# Patient Record
Sex: FEMALE | Race: WHITE | Employment: FULL TIME | URBAN - METROPOLITAN AREA
[De-identification: names, ages, dates, MRNs, and addresses within clinical notes are randomized per-mention and may not be internally consistent; named-entity substitution may affect disease eponyms.]

---

## 2021-07-16 ENCOUNTER — APPOINTMENT (OUTPATIENT)
Dept: CT IMAGING | Age: 36
End: 2021-07-16

## 2021-07-16 ENCOUNTER — HOSPITAL ENCOUNTER (OUTPATIENT)
Age: 36
Setting detail: OBSERVATION
Discharge: HOME | End: 2021-07-17
Attending: EMERGENCY MEDICINE | Admitting: INTERNAL MEDICINE

## 2021-07-16 DIAGNOSIS — R56.9 SEIZURE-LIKE ACTIVITY (HCC): Primary | ICD-10-CM

## 2021-07-16 LAB
-: ABNORMAL
ABSOLUTE EOS #: 0.1 K/UL (ref 0–0.44)
ABSOLUTE IMMATURE GRANULOCYTE: 0.02 K/UL (ref 0–0.3)
ABSOLUTE LYMPH #: 1.66 K/UL (ref 1.1–3.7)
ABSOLUTE MONO #: 0.51 K/UL (ref 0.1–1.2)
ACETAMINOPHEN LEVEL: <10 UG/ML (ref 10–30)
ALBUMIN SERPL-MCNC: 4.3 G/DL (ref 3.5–5.2)
ALBUMIN/GLOBULIN RATIO: ABNORMAL (ref 1–2.5)
ALP BLD-CCNC: 52 U/L (ref 35–104)
ALT SERPL-CCNC: 14 U/L (ref 5–33)
AMORPHOUS: ABNORMAL
AMPHETAMINE SCREEN URINE: NEGATIVE
ANION GAP SERPL CALCULATED.3IONS-SCNC: 14 MMOL/L (ref 9–17)
AST SERPL-CCNC: 16 U/L
BACTERIA: ABNORMAL
BARBITURATE SCREEN URINE: NEGATIVE
BASOPHILS # BLD: 0 % (ref 0–2)
BASOPHILS ABSOLUTE: 0.03 K/UL (ref 0–0.2)
BENZODIAZEPINE SCREEN, URINE: NEGATIVE
BILIRUB SERPL-MCNC: 0.36 MG/DL (ref 0.3–1.2)
BILIRUBIN URINE: NEGATIVE
BUN BLDV-MCNC: 13 MG/DL (ref 6–20)
BUN/CREAT BLD: 16 (ref 9–20)
BUPRENORPHINE URINE: ABNORMAL
CALCIUM SERPL-MCNC: 9.5 MG/DL (ref 8.6–10.4)
CANNABINOID SCREEN URINE: POSITIVE
CASTS UA: ABNORMAL /LPF
CHLORIDE BLD-SCNC: 101 MMOL/L (ref 98–107)
CO2: 19 MMOL/L (ref 20–31)
COCAINE METABOLITE, URINE: NEGATIVE
COLOR: YELLOW
COMMENT UA: ABNORMAL
CREAT SERPL-MCNC: 0.81 MG/DL (ref 0.5–0.9)
CRYSTALS, UA: ABNORMAL /HPF
DIFFERENTIAL TYPE: ABNORMAL
EOSINOPHILS RELATIVE PERCENT: 2 % (ref 1–4)
EPITHELIAL CELLS UA: ABNORMAL /HPF (ref 0–5)
ETHANOL PERCENT: <0.01 %
ETHANOL: <10 MG/DL
GFR AFRICAN AMERICAN: >60 ML/MIN
GFR NON-AFRICAN AMERICAN: >60 ML/MIN
GFR SERPL CREATININE-BSD FRML MDRD: ABNORMAL ML/MIN/{1.73_M2}
GFR SERPL CREATININE-BSD FRML MDRD: ABNORMAL ML/MIN/{1.73_M2}
GLUCOSE BLD-MCNC: 99 MG/DL (ref 70–99)
GLUCOSE URINE: NEGATIVE
HCG QUALITATIVE: NEGATIVE
HCT VFR BLD CALC: 41.1 % (ref 36.3–47.1)
HEMOGLOBIN: 13.5 G/DL (ref 11.9–15.1)
IMMATURE GRANULOCYTES: 0 %
KETONES, URINE: ABNORMAL
LEUKOCYTE ESTERASE, URINE: NEGATIVE
LYMPHOCYTES # BLD: 24 % (ref 24–43)
MAGNESIUM: 2 MG/DL (ref 1.6–2.6)
MCH RBC QN AUTO: 29.7 PG (ref 25.2–33.5)
MCHC RBC AUTO-ENTMCNC: 32.8 G/DL (ref 28.4–34.8)
MCV RBC AUTO: 90.5 FL (ref 82.6–102.9)
MDMA URINE: ABNORMAL
METHADONE SCREEN, URINE: NEGATIVE
METHAMPHETAMINE, URINE: ABNORMAL
MONOCYTES # BLD: 8 % (ref 3–12)
MUCUS: ABNORMAL
NITRITE, URINE: NEGATIVE
NRBC AUTOMATED: 0 PER 100 WBC
OPIATES, URINE: NEGATIVE
OTHER OBSERVATIONS UA: ABNORMAL
OXYCODONE SCREEN URINE: NEGATIVE
PDW BLD-RTO: 12.6 % (ref 11.8–14.4)
PH UA: 8.5 (ref 5–8)
PHENCYCLIDINE, URINE: NEGATIVE
PLATELET # BLD: 288 K/UL (ref 138–453)
PLATELET ESTIMATE: ABNORMAL
PMV BLD AUTO: 9.9 FL (ref 8.1–13.5)
POTASSIUM SERPL-SCNC: 3.5 MMOL/L (ref 3.7–5.3)
PROLACTIN: 91.55 UG/L (ref 4.79–23.3)
PROPOXYPHENE, URINE: ABNORMAL
PROTEIN UA: NEGATIVE
RBC # BLD: 4.54 M/UL (ref 3.95–5.11)
RBC # BLD: ABNORMAL 10*6/UL
RBC UA: ABNORMAL /HPF (ref 0–2)
RENAL EPITHELIAL, UA: ABNORMAL /HPF
SALICYLATE LEVEL: <1 MG/DL (ref 3–10)
SEG NEUTROPHILS: 66 % (ref 36–65)
SEGMENTED NEUTROPHILS ABSOLUTE COUNT: 4.5 K/UL (ref 1.5–8.1)
SODIUM BLD-SCNC: 134 MMOL/L (ref 135–144)
SPECIFIC GRAVITY UA: 1.01 (ref 1–1.03)
TEST INFORMATION: ABNORMAL
TOTAL PROTEIN: 7.2 G/DL (ref 6.4–8.3)
TOXIC TRICYCLIC SC,BLOOD: NEGATIVE
TRICHOMONAS: ABNORMAL
TRICYCLIC ANTIDEPRESSANTS, UR: ABNORMAL
TURBIDITY: CLEAR
URINE HGB: NEGATIVE
UROBILINOGEN, URINE: NORMAL
WBC # BLD: 6.8 K/UL (ref 3.5–11.3)
WBC # BLD: ABNORMAL 10*3/UL
WBC UA: ABNORMAL /HPF (ref 0–5)
YEAST: ABNORMAL

## 2021-07-16 PROCEDURE — 2580000003 HC RX 258: Performed by: EMERGENCY MEDICINE

## 2021-07-16 PROCEDURE — 85025 COMPLETE CBC W/AUTO DIFF WBC: CPT

## 2021-07-16 PROCEDURE — 84703 CHORIONIC GONADOTROPIN ASSAY: CPT

## 2021-07-16 PROCEDURE — 84146 ASSAY OF PROLACTIN: CPT

## 2021-07-16 PROCEDURE — G0378 HOSPITAL OBSERVATION PER HR: HCPCS

## 2021-07-16 PROCEDURE — 93005 ELECTROCARDIOGRAM TRACING: CPT | Performed by: EMERGENCY MEDICINE

## 2021-07-16 PROCEDURE — G0480 DRUG TEST DEF 1-7 CLASSES: HCPCS

## 2021-07-16 PROCEDURE — 80179 DRUG ASSAY SALICYLATE: CPT

## 2021-07-16 PROCEDURE — 80053 COMPREHEN METABOLIC PANEL: CPT

## 2021-07-16 PROCEDURE — 81025 URINE PREGNANCY TEST: CPT

## 2021-07-16 PROCEDURE — 81001 URINALYSIS AUTO W/SCOPE: CPT

## 2021-07-16 PROCEDURE — 99285 EMERGENCY DEPT VISIT HI MDM: CPT

## 2021-07-16 PROCEDURE — 6360000002 HC RX W HCPCS: Performed by: EMERGENCY MEDICINE

## 2021-07-16 PROCEDURE — 80143 DRUG ASSAY ACETAMINOPHEN: CPT

## 2021-07-16 PROCEDURE — 99220 PR INITIAL OBSERVATION CARE/DAY 70 MINUTES: CPT | Performed by: PSYCHIATRY & NEUROLOGY

## 2021-07-16 PROCEDURE — 99219 PR INITIAL OBSERVATION CARE/DAY 50 MINUTES: CPT | Performed by: INTERNAL MEDICINE

## 2021-07-16 PROCEDURE — 2580000003 HC RX 258: Performed by: INTERNAL MEDICINE

## 2021-07-16 PROCEDURE — 96365 THER/PROPH/DIAG IV INF INIT: CPT

## 2021-07-16 PROCEDURE — 96361 HYDRATE IV INFUSION ADD-ON: CPT

## 2021-07-16 PROCEDURE — 83735 ASSAY OF MAGNESIUM: CPT

## 2021-07-16 PROCEDURE — 80307 DRUG TEST PRSMV CHEM ANLYZR: CPT

## 2021-07-16 PROCEDURE — 70450 CT HEAD/BRAIN W/O DYE: CPT

## 2021-07-16 RX ORDER — MAGNESIUM SULFATE 1 G/100ML
1000 INJECTION INTRAVENOUS PRN
Status: DISCONTINUED | OUTPATIENT
Start: 2021-07-16 | End: 2021-07-17 | Stop reason: HOSPADM

## 2021-07-16 RX ORDER — SODIUM CHLORIDE 0.9 % (FLUSH) 0.9 %
5-40 SYRINGE (ML) INJECTION EVERY 12 HOURS SCHEDULED
Status: DISCONTINUED | OUTPATIENT
Start: 2021-07-16 | End: 2021-07-17 | Stop reason: HOSPADM

## 2021-07-16 RX ORDER — POLYETHYLENE GLYCOL 3350 17 G/17G
17 POWDER, FOR SOLUTION ORAL DAILY PRN
Status: DISCONTINUED | OUTPATIENT
Start: 2021-07-16 | End: 2021-07-17 | Stop reason: HOSPADM

## 2021-07-16 RX ORDER — ACETAMINOPHEN 650 MG/1
650 SUPPOSITORY RECTAL EVERY 6 HOURS PRN
Status: DISCONTINUED | OUTPATIENT
Start: 2021-07-16 | End: 2021-07-17 | Stop reason: HOSPADM

## 2021-07-16 RX ORDER — HYDROXYZINE HYDROCHLORIDE 25 MG/1
25 TABLET, FILM COATED ORAL 3 TIMES DAILY PRN
Status: DISCONTINUED | OUTPATIENT
Start: 2021-07-16 | End: 2021-07-17 | Stop reason: HOSPADM

## 2021-07-16 RX ORDER — LORAZEPAM 2 MG/ML
2 INJECTION INTRAMUSCULAR EVERY 4 HOURS PRN
Status: DISCONTINUED | OUTPATIENT
Start: 2021-07-16 | End: 2021-07-17 | Stop reason: HOSPADM

## 2021-07-16 RX ORDER — ACETAMINOPHEN 325 MG/1
650 TABLET ORAL EVERY 6 HOURS PRN
Status: DISCONTINUED | OUTPATIENT
Start: 2021-07-16 | End: 2021-07-17 | Stop reason: HOSPADM

## 2021-07-16 RX ORDER — 0.9 % SODIUM CHLORIDE 0.9 %
1000 INTRAVENOUS SOLUTION INTRAVENOUS ONCE
Status: COMPLETED | OUTPATIENT
Start: 2021-07-16 | End: 2021-07-16

## 2021-07-16 RX ORDER — HYDROXYZINE PAMOATE 25 MG/1
25 CAPSULE ORAL 3 TIMES DAILY PRN
COMMUNITY

## 2021-07-16 RX ORDER — ONDANSETRON 4 MG/1
4 TABLET, ORALLY DISINTEGRATING ORAL EVERY 8 HOURS PRN
Status: DISCONTINUED | OUTPATIENT
Start: 2021-07-16 | End: 2021-07-17 | Stop reason: HOSPADM

## 2021-07-16 RX ORDER — POTASSIUM CHLORIDE 20 MEQ/1
40 TABLET, EXTENDED RELEASE ORAL PRN
Status: DISCONTINUED | OUTPATIENT
Start: 2021-07-16 | End: 2021-07-17 | Stop reason: HOSPADM

## 2021-07-16 RX ORDER — IBUPROFEN 800 MG/1
800 TABLET ORAL EVERY 6 HOURS PRN
Status: DISCONTINUED | OUTPATIENT
Start: 2021-07-16 | End: 2021-07-17 | Stop reason: HOSPADM

## 2021-07-16 RX ORDER — ONDANSETRON 2 MG/ML
4 INJECTION INTRAMUSCULAR; INTRAVENOUS EVERY 6 HOURS PRN
Status: DISCONTINUED | OUTPATIENT
Start: 2021-07-16 | End: 2021-07-17 | Stop reason: HOSPADM

## 2021-07-16 RX ORDER — POTASSIUM CHLORIDE 7.45 MG/ML
10 INJECTION INTRAVENOUS PRN
Status: DISCONTINUED | OUTPATIENT
Start: 2021-07-16 | End: 2021-07-17 | Stop reason: HOSPADM

## 2021-07-16 RX ORDER — SODIUM CHLORIDE 9 MG/ML
25 INJECTION, SOLUTION INTRAVENOUS PRN
Status: DISCONTINUED | OUTPATIENT
Start: 2021-07-16 | End: 2021-07-17 | Stop reason: HOSPADM

## 2021-07-16 RX ORDER — SODIUM CHLORIDE 0.9 % (FLUSH) 0.9 %
10 SYRINGE (ML) INJECTION PRN
Status: DISCONTINUED | OUTPATIENT
Start: 2021-07-16 | End: 2021-07-17 | Stop reason: HOSPADM

## 2021-07-16 RX ORDER — CITALOPRAM 20 MG/1
40 TABLET ORAL DAILY
Status: DISCONTINUED | OUTPATIENT
Start: 2021-07-17 | End: 2021-07-17 | Stop reason: HOSPADM

## 2021-07-16 RX ADMIN — SODIUM CHLORIDE, PRESERVATIVE FREE 10 ML: 5 INJECTION INTRAVENOUS at 22:14

## 2021-07-16 RX ADMIN — SODIUM CHLORIDE 1000 ML: 9 INJECTION, SOLUTION INTRAVENOUS at 16:04

## 2021-07-16 RX ADMIN — LEVETIRACETAM 1000 MG: 100 INJECTION, SOLUTION INTRAVENOUS at 16:55

## 2021-07-16 ASSESSMENT — ENCOUNTER SYMPTOMS
TROUBLE SWALLOWING: 0
NAUSEA: 0
VOMITING: 0
BACK PAIN: 0
VOICE CHANGE: 0
SHORTNESS OF BREATH: 0

## 2021-07-16 ASSESSMENT — PAIN SCALES - GENERAL
PAINLEVEL_OUTOF10: 0
PAINLEVEL_OUTOF10: 0

## 2021-07-16 NOTE — ED NOTES
Bed: 18  Expected date: 7/16/21  Expected time: 3:17 PM  Means of arrival: [facility]  Comments:  [facility]     Jose Beavers RN  07/16/21 1526

## 2021-07-16 NOTE — CONSULTS
[facility] Neurology   IN-PATIENT SERVICE      NEUROLOGY CONSULT  NOTE            Date:   7/16/2021  Patient name:  Bhavya Bojorquez  Date of admission:  7/16/2021  YOB: 1985      Chief Complaint:     Chief Complaint   Patient presents with    Seizures     ater COVID shot       Reason for Consult:      Seizure like activity     History of Present Illness: The patient is a 38 y.o. female who presents with Seizures (ater COVID shot)  . The patient was seen and examined and the chart was reviewed. Received covid vaccine earlier today, then few mins after started to feel anxious, eyes rolled back, started to have convulsions and lost consciousness. No described loss of bowel/bladder control. No tongue biting, or foaming at mouth. Brought to ED for further work up. Prolactin level 91. (+) cannabinoids. CT head negative. Currently at baseline, awake, oriented exam non focal. States similar event in the past when receiving nose piercing in which she does recall feeling anxious prior to. She has hx of anxiety in which she takes Celexa and Vistaril for. She will be admitted for further seizure work up. Past Medical History:     Past Medical History:   Diagnosis Date    Anxiety     Depression     Syncope     has had involuntary movements with it        Past Surgical History:     History reviewed. No pertinent surgical history. Medications Prior to Admission:     Prior to Admission medications    Medication Sig Start Date End Date Taking? Authorizing Provider   hydrOXYzine (VISTARIL) 25 MG capsule Take 25 mg by mouth 3 times daily as needed for Itching   Yes Historical Provider, MD   citalopram (CELEXA) 40 MG tablet Take 40 mg by mouth daily. Yes Historical Provider, MD   ibuprofen (ADVIL;MOTRIN) 800 MG tablet Take 1 tablet by mouth every 6 hours as needed for Pain.  2/16/15   ASHLEY Heath - CNP        Allergies:     Sudafed [pseudoephedrine], Sulfa antibiotics, and Wellbutrin [bupropion]    Social History:     Tobacco:    reports that she has quit smoking. She has never used smokeless tobacco.  Alcohol:      reports current alcohol use. Drug Use:  reports current drug use. Drug: Marijuana. Family History:     Family History   Problem Relation Age of Onset    Hypertension Mother     High Cholesterol Mother        Review of Systems:       Constitutional Negative for fever and chills   HEENT Negative for ear discharge, ear pain, nosebleed. Negative for photophobia, headache. Musculoskeletal Negative for joint pain, negative for myalgia   Respiratory Negative for cough, positive for dyspnea during episode. Negative for hemoptysis and sputum. Cardiovascular Negative for palpitations, chest pain. Negative for orthopnea, claudication. Gastrointestinal Negative for nausea, vomiting. Negative for abdominal pain, diarrhea, blood in stool   Genitourinary  Negative for dysuria, hematuria. Negative for suprapubic pain. Negative for bladder incontinence. Skin Negative for rash or itching   Hematology Negative for ecchymosis, anemia   Psychiatric Positive for anxiety,negative for depression.  Negative for suicidal ideation, hallucinations         Physical Exam:   /73   Pulse 81   Temp 98.1 °F (36.7 °C) (Oral)   Resp 18   Ht 5' 4\" (1.626 m)   Wt 165 lb 5.5 oz (75 kg)   SpO2 99%   BMI 28.38 kg/m²   Temp (24hrs), Av.4 °F (36.9 °C), Min:98.1 °F (36.7 °C), Max:98.6 °F (37 °C)        General examination:      General Appearance:  alert, well appearing, and in no acute distress  HEENT: Normocephalic, atraumatic, moist mucus membranes  Neck: supple, no carotid bruits, (-) nuchal rigidity  Lungs:  Respirations unlabored, chest wall no deformity, BS normal  Cardiovascular: normal rate, regular rhythm  Abdomen: Soft, nontender, nondistended, normal bowel sounds  Skin: No gross lesions, rashes, bruising or bleeding on exposed skin area  Extremities:  peripheral pulses palpable, no cyanosis, clubbing or edema  Psych: normal affect      Neurological examination:    Mental status   Alert and oriented x 3; following all commands; speech is fluent, no dysarthria, aphasia. Cranial nerves   II - visual fields intact to confrontation; pupils reactive  III, IV, VI - extraocular muscles intact; no WALLY; no nystagmus; no ptosis   V - normal facial sensation                                                               VII - normal facial symmetry                                                             VIII - intact hearing                                                                             IX, X - symmetrical palate elevation                                               XI - symmetrical shoulder shrug                                                       XII - midline tongue without atrophy or fasciculation     Motor function  Strength: 5/5 RUE, 5/5 RLE, 5/5 LUE, 5/5  LLE  Normal bulk and tone. No tremors                      Sensory function Intact to touch, pin, vibration, proprioception throughout     Cerebellar Intact finger-nose-finger testing. Intact heel-shin testing. No dysdiadochokinesia present. Reflex function 2/4 symmetric throughout . Downgoing plantar response bilaterally. (-)Ruby's sign bilaterally    Gait                  Normal station and gait             Diagnostics:      Laboratory Testing:  CBC:   Recent Labs     07/16/21  1552   WBC 6.8   HGB 13.5        BMP:    Recent Labs     07/16/21  1552   *   K 3.5*      CO2 19*   BUN 13   CREATININE 0.81   GLUCOSE 99         Lab Results   Component Value Date    ALT 14 07/16/2021    AST 16 07/16/2021       Imaging/Diagnostics:      CT head: No acute process      I personally reviewed all of the above medications, clinical laboratory, imaging and other diagnostic tests. Impression:      1.  Seizure like activity with feeling of anxiety preceding event; suspect convulsive syncope, doubt

## 2021-07-16 NOTE — ED PROVIDER NOTES
[facility]  Emergency Department Encounter     Pt Name: Denise Jones  MRN: [id number]  [place] 1985  Date of evaluation: 7/16/21  PCP:  No primary care provider on file. CHIEF COMPLAINT       Chief Complaint   Patient presents with    Seizures     ater COVID shot       HISTORY OF PRESENT ILLNESS  (Location/Symptom, Timing/Onset, Context/Setting, Quality, Duration, Modifying Factors, Severity.)    Denise Jones is a 38 y.o. female who presents with first-time seizure. Patient states that she was getting her first Covid vaccine at the [address] [place] [address]. She states that she got the vaccine was fine for the first 5 minutes. She states that she was sitting in a chair watching her daughter get ready for her vaccine and this is a lasting that she remembers. Per bystanders she went stiff, eyes rolled back in her head, had generalized shaking and was completely out of it for about 45 minutes afterwards. Blood glucose per EMS was normal.  No history of seizures personally or no family history of seizures. No history of migraines, no history of aneurysms, no recent falls or head trauma. Patient states that she current feels tingly in her lips, hands, feet but no focal weakness. No changes in her vision, no headache, no nausea or vomiting. Did not urinate or defecate herself. PAST MEDICAL / SURGICAL / SOCIAL / FAMILY HISTORY    has a past medical history of Anxiety, Depression, and Syncope.     has no past surgical history on file.     Social History     Socioeconomic History    Marital status: Single     Spouse name: Not on file    Number of children: Not on file    Years of education: Not on file    Highest education level: Not on file   Occupational History    Not on file   Tobacco Use    Smoking status: Former Smoker    Smokeless tobacco: Never Used   Vaping Use    Vaping Use: Every day    Substances: Nicotine    Devices: Pre-filled or refillable cartridge Substance and Sexual Activity    Alcohol use: Yes     Comment: social    Drug use: Yes     Types: Marijuana    Sexual activity: Not on file   Other Topics Concern    Not on file   Social History Narrative    Not on file     Social Determinants of Health     Financial Resource Strain:     Difficulty of Paying Living Expenses:    Food Insecurity:     Worried About Running Out of Food in the Last Year:     [address] in the Last Year:    Transportation Needs:     Lack of Transportation (Medical):  Lack of Transportation (Non-Medical):    Physical Activity:     Days of Exercise per Week:     Minutes of Exercise per Session:    Stress:     Feeling of Stress :    Social Connections:     Frequency of Communication with Friends and Family:     Frequency of Social Gatherings with Friends and Family:     Attends Anabaptism Services:     Active Member of Clubs or Organizations:     Attends Club or Organization Meetings:     Marital Status:    Intimate Partner Violence:     Fear of Current or Ex-Partner:     Emotionally Abused:     Physically Abused:     Sexually Abused:        Family History   Problem Relation Age of Onset    Hypertension Mother     High Cholesterol Mother        Allergies:    Sudafed [pseudoephedrine], Sulfa antibiotics, and Wellbutrin [bupropion]    Home Medications:  Prior to Admission medications    Medication Sig Start Date End Date Taking? Authorizing Provider   hydrOXYzine (VISTARIL) 25 MG capsule Take 25 mg by mouth 3 times daily as needed for Itching   Yes Historical Provider, MD   citalopram (CELEXA) 40 MG tablet Take 40 mg by mouth daily. Yes Historical Provider, MD   ibuprofen (ADVIL;MOTRIN) 800 MG tablet Take 1 tablet by mouth every 6 hours as needed for Pain. 2/16/15   ASHLEY Yun - CNP       REVIEW OF SYSTEMS    (2-9 systems for level 4, 10 or more for level 5)    Review of Systems   Constitutional: Negative for diaphoresis.    HENT: Negative for drooling, tinnitus, trouble swallowing and voice change. Eyes: Negative for visual disturbance. Respiratory: Negative for shortness of breath. Cardiovascular: Negative for chest pain. Gastrointestinal: Negative for nausea and vomiting. Genitourinary: Negative for enuresis. Musculoskeletal: Negative for back pain and neck pain. Skin: Negative for wound. Neurological: Positive for seizures, syncope and numbness. Negative for dizziness, tremors, weakness, light-headedness and headaches. Hematological: Does not bruise/bleed easily. Psychiatric/Behavioral: Positive for confusion. PHYSICAL EXAM   (up to 7 for level 4, 8 or more for level 5)    VITALS:   Vitals:    07/16/21 1532 07/16/21 1600 07/16/21 1615   BP: 118/68 107/76 107/67   Pulse: 73 83 87   Resp: 16 17 18   Temp: 98.6 °F (37 °C)     TempSrc: Oral     SpO2: 100% 100% 100%   Weight: 155 lb (70.3 kg)     Height: 5' 4\" (1.626 m)         Physical Exam  Vitals and nursing note reviewed. Constitutional:       General: She is not in acute distress. Appearance: She is well-developed. She is not diaphoretic. HENT:      Head: Normocephalic and atraumatic. Right Ear: External ear normal.      Left Ear: External ear normal.      Nose: Nose normal.      Mouth/Throat:      Mouth: Mucous membranes are moist.      Pharynx: Oropharynx is clear. Eyes:      Extraocular Movements: Extraocular movements intact. Conjunctiva/sclera: Conjunctivae normal.      Pupils: Pupils are equal, round, and reactive to light. Cardiovascular:      Rate and Rhythm: Normal rate and regular rhythm. Heart sounds: Normal heart sounds. No murmur heard. Pulmonary:      Effort: Pulmonary effort is normal. No respiratory distress. Breath sounds: Normal breath sounds. No wheezing, rhonchi or rales. Abdominal:      General: There is no distension. Palpations: Abdomen is soft. Tenderness: There is no abdominal tenderness.  There is no guarding or rebound. Musculoskeletal:         General: Normal range of motion. Cervical back: Normal range of motion and neck supple. Right lower leg: No edema. Left lower leg: No edema. Skin:     General: Skin is warm and dry. Coloration: Skin is not pale. Neurological:      General: No focal deficit present. Mental Status: She is alert and oriented to person, place, and time. GCS: GCS eye subscore is 4. GCS verbal subscore is 5. GCS motor subscore is 6. Cranial Nerves: Cranial nerves are intact. Sensory: Sensation is intact. Motor: Motor function is intact. Coordination: Coordination is intact. Deep Tendon Reflexes: Babinski sign absent on the right side.    Psychiatric:         Behavior: Behavior normal.         DIFFERENTIAL  DIAGNOSIS   PLAN (LABS / IMAGING / EKG):  Orders Placed This Encounter   Procedures    CT HEAD WO CONTRAST    CBC with DIFF    Comprehensive Metabolic Panel w/ Reflex to MG    PROLACTIN    HCG Qualitative, Serum    TOX SCR, BLD, ED    Urinalysis with Microscopic    Urine Drug Screen    Magnesium    Telemetry monitoring - continuous duration    Inpatient consult to Neurology    Inpatient consult to Hospitalist    EKG 12 Lead    Insert peripheral IV    PATIENT STATUS (FROM ED OR OR/PROCEDURAL) Observation    Seizure precautions       MEDICATIONS ORDERED:  Orders Placed This Encounter   Medications    0.9 % sodium chloride bolus    levETIRAcetam (KEPPRA) 1,000 mg in sodium chloride 0.9 % 100 mL IVPB       DIAGNOSTIC RESULTS / EMERGENCYDEPARTMENT COURSE / MDM   LABS:  Labs Reviewed   CBC WITH AUTO DIFFERENTIAL - Abnormal; Notable for the following components:       Result Value    Seg Neutrophils 66 (*)     All other components within normal limits   COMPREHENSIVE METABOLIC PANEL W/ REFLEX TO MG FOR LOW K - Abnormal; Notable for the following components:    Sodium 134 (*)     Potassium 3.5 (*)     CO2 19 (*)     All other components within normal limits   TOX SCR, BLD, ED - Abnormal; Notable for the following components:    Acetaminophen Level <80 (*)     Salicylate Lvl <1 (*)     All other components within normal limits   URINALYSIS WITH MICROSCOPIC - Abnormal; Notable for the following components:    Ketones, Urine TRACE (*)     pH, UA 8.5 (*)     All other components within normal limits   URINE DRUG SCREEN - Abnormal; Notable for the following components:    Cannabinoid Scrn, Ur POSITIVE (*)     All other components within normal limits   HCG, SERUM, QUALITATIVE   MAGNESIUM   PROLACTIN       RADIOLOGY:  CT HEAD WO CONTRAST    Result Date: 7/16/2021  EXAMINATION: CT OF THE HEAD WITHOUT CONTRAST  7/16/2021 1:57 pm TECHNIQUE: CT of the head was performed without the administration of intravenous contrast. Dose modulation, iterative reconstruction, and/or weight based adjustment of the mA/kV was utilized to reduce the radiation dose to as low as reasonably achievable. COMPARISON: None. HISTORY: ORDERING SYSTEM PROVIDED HISTORY: first time seizure TECHNOLOGIST PROVIDED HISTORY: first time seizure Decision Support Exception - unselect if not a suspected or confirmed emergency medical condition->Emergency Medical Condition (MA) Is the patient pregnant?->No Reason for Exam: Pt states she had her first dose of the covid shot and had a seizure 4 minutes afterwards. Pt says she doesn't have any history of seizures ever before. Acuity: Acute Type of Exam: Initial Mechanism of Injury: seizure FINDINGS: BRAIN/VENTRICLES: No acute loss of the gray-white matter differentiation is identified to suggest acute or subacute infarct. No masses or hemorrhages within the brain parenchyma are found. No evidence of midline shift. The intracranial vasculature, including the dural venous sinuses, is within normal limits. ORBITS: No acute orbital abnormalities are identified. SINUSES: The visualized paranasal sinuses and mastoid air cells are clear. SOFT TISSUES/SKULL: The calvarium is intact. Extracranial soft tissues are unremarkable. No acute intracranial abnormality.        EKG    EKG Interpretation    Interpreted by emergency department physician    Rhythm: normal sinus   Rate: normal  Axis: normal  Ectopy: none  Conduction: normal  ST Segments: no acute change  T Waves: no acute change  Q Waves: III    Clinical Impression: non-specific EKG    All EKG's are interpreted by the Emergency Department Physician whoeither signs or Co-signs this chart in the absence of a cardiologist.    EMERGENCY DEPARTMENT COURSE:  ED Course as of Jul 16 1916 Fri Jul 16, 2021   1549 Per nursing patient informed her that she \"had a feeling that she was going to have a seizure after her vaccine\" earlier today    [AO]   1602 CBC with DIFF(!):    WBC 6.8   RBC 4.54   Hemoglobin Quant 13.5   Hematocrit 41.1   MCV 90.5   MCH 29.7   MCHC 32.8   RDW 12.6   Platelet Count 223   MPV 9.9   NRBC Automated 0.0   Differential Type NOT REPORTED   Seg Neutrophils 66(!)   Lymphocytes 24   Monocytes 8   Eosinophils % 2   Basophils 0   Immature Granulocytes 0   Segs Absolute 4.50   Absolute Lymph # 1.66   Absolute Mono # 0.51   Absolute Eos # 0.10   Basophils Absolute 0.03   Absolute Immature Granulocyte 0.02   WBC Morphology NOT REPORTED   R... [AO]   1617 hCG Qual: NEGATIVE [AO]   1631 TOX SCR, BLD, ED(!):    Acetaminophen Level <10(!)   Ethanol <10   Ethanol percent <3.622   Salicylate Lvl <1(!)   Toxic Tricyclic Sc,Blood PENDING [AO]   1631 Comprehensive Metabolic Panel w/ Reflex to MG(!):    Glucose 99   BUN 13   Creatinine 0.81   Bun/Cre Ratio 16   Calcium 9.5   Sodium 134(!)   Potassium PENDING   Chloride 101   CO2 19(!)   Anion Gap 14   Alk Phos 52   ALT 14   AST 16   Bilirubin 0.36   Total Protein 7.2   Albumin 4.3   Albumin/Globulin Ratio NOT REPORTED   GFR Non- >60   GFR  >60   GFR Comment        GFR Staging NOT REPORTED [AO]   1707 Magnesium: 2.0 [AO]   1735 CT HEAD WO CONTRAST [AO]   0306 Spoke to Dr. Alessia Singleton, will follow along as consult, intermed to admit primary    [AO]   [id number] Accepted for admission by interm    [AO]      ED Course User Index  [AO] Nini Taylor 1721, DO       MDM  Number of Diagnoses or Management Options  Seizure-like activity [facility])  Diagnosis management comments: 47-year-old female with no neurological history presents emergency department after witnessed seizure-like activity after getting her Covid vaccine. On my evaluation she is in no acute distress normal vital signs. Nonfocal neurological exam.  However as this is possible first-time seizure she warrants work-up. Labs unremarkable. Urine drug screen positive for cannabis, however no UTI or other illicit drugs in her system. CT head negative. EKG unremarkable. Discussed admission with patient for first-time work-up, she was concerned about insurance. I did speak to neurology who evaluated her at bedside. I also spoke to internal medicine who is agreeable to admit her. Patient several 10 on admission requesting outpatient follow-up, however we discussed the limitations on driving she was more agreeable to inpatient work-up. Patient hemodynamically stable with no seizure-like episodes in the emergency department. Admitted to hospitalist as primary with neurology on his consult. Patient was loaded with 1 g of Keppra while in the emergency department. Amount and/or Complexity of Data Reviewed  Clinical lab tests: ordered and reviewed  Tests in the radiology section of CPT®: ordered and reviewed  Review and summarize past medical records: yes  Independent visualization of images, tracings, or specimens: yes    Patient Progress  Patient progress: stable      PROCEDURES:  Procedures     CONSULTS:  IP CONSULT TO NEUROLOGY  IP CONSULT TO HOSPITALIST  IP CONSULT TO NEUROLOGY    CRITICAL CARE:  NONE    FINAL IMPRESSION     1.  Seizure-like activity ([address]        DISPOSITION / Michelle Moore.  Kody Kats,   Emergency Medicine Physician    (Please note that portions of this note were completed with a voice recognition program.  Efforts were made to edit the dictations but occasionally words are mis-transcribed.)        Nini Taylor 1721,   Resident  07/16/21 1916

## 2021-07-16 NOTE — H&P
[facility]  Office: [address], DO, Verónicaeric Neri, DO, Corikeely Rdz DO, Ericka Clay, DO, Chani Panchal MD, Gerda Mcarthur MD, Katherine Granados MD, Johnny Craven MD, Anish Hope MD, Olinda Llanes MD, Sasha Chang MD, Shelley Marquis, DO, Artem Keith MD, Juan Luis Johnson DO, Katelynn Szymanski MD,  Carey Jean DO, Deandre Tate MD, Cynthia Gardiner MD, Jake Fox MD, Rony Van MD, Bonnie Suarez MD, Mahogany Tao MD, Arslan Wilson, [facility], [address], [facility], Chava Escamilla, CNP, Tara Malik, CNS, Eladio Andrew, CNP, Jerri Edwards, CNP, Narciso Harris, CNP, Simona Carpenter, [facility], Bora Deleon, CNP, Wendy Miles PA-C, Yue Chirinos, [facility], Ines Garzon, CNP, Savannah Martinez, CNP, Edgar Shankar, CNP, Corky Zheng, CNP, Jenny Hanson, CNP, Jericho Davepnort, CNP, Jean-Paul Quevedo CNP, Holly Winters, [facility]    HISTORY AND PHYSICAL EXAMINATION            Date:   7/16/2021  Patient name:  Lilian Luz  Date of admission:  7/16/2021  3:28 PM  MRN:   [id number]  Account:  [de-identified]  YOB: 1985  PCP:    No primary care provider on file. Room:   [place] [address]  Code Status:    No Order    Chief Complaint:     Chief Complaint   Patient presents with    Seizures     ater COVID shot       History Obtained From:     patient, electronic medical record    History of Present Illness:     Lilian Luz is a 38 y.o. Non-/non  female who presents with Seizures (ater COVID shot)   and is admitted to the hospital for the management of Seizure-like activity ([facility] [address] This 38 yof just had her 1st covid vaccine, watched her niece get it, then her daughter was about to get hers when she passed out and had tonic clonic activity, then was unarousable for 5-10 min per report. This occurred about 5 minutes after injection.   She has had several episodes of syncope in past, including getting swelling/edema   ALLERGIC/IMMUNOLOGIC:  negative for urticaria , itching  ENDOCRINE:  negative increase in drinking, increase in urination, hot or cold intolerance  MUSCULOSKELETAL:  negative joint pains, muscle aches, swelling of joints  NEUROLOGICAL:  negative for headaches, dizziness, lightheadedness, numbness, pain, tingling extremities  BEHAVIOR/PSYCH:  positive for depression, anxiety    Physical Exam:   /67   Pulse 87   Temp 98.6 °F (37 °C) (Oral)   Resp 18   Ht 5' 4\" (1.626 m)   Wt 155 lb (70.3 kg)   SpO2 100%   BMI 26.61 kg/m²   Temp (24hrs), Av.6 °F (37 °C), Min:98.6 °F (37 °C), Max:98.6 °F (37 °C)    No results for input(s): POCGLU in the last 72 hours. No intake or output data in the 24 hours ending 21 1823    General Appearance:  alert, well appearing, and in no acute distress  Mental status: oriented to person, place, and time  Head:  normocephalic, atraumatic  Eye: no icterus, redness, pupils equal and reactive, extraocular eye movements intact, conjunctiva clear  Ear: normal external ear, no discharge, hearing intact  Nose:  no drainage noted  Mouth: mucous membranes moist  Neck: supple, no carotid bruits, thyroid not palpable  Lungs: Bilateral equal air entry, clear to auscultation, no wheezing, rales or rhonchi, normal effort  Cardiovascular: normal rate, regular rhythm, no murmur, gallop, rub.   Abdomen: Soft, nontender, nondistended, normal bowel sounds, no hepatomegaly or splenomegaly  Neurologic: There are no new focal motor or sensory deficits, normal muscle tone and bulk, no abnormal sensation, normal speech, cranial nerves II through XII grossly intact  Skin: No gross lesions, rashes, bruising or bleeding on exposed skin area  Extremities:  peripheral pulses palpable, no pedal edema or calf pain with palpation  Psych: normal affect     Investigations:      Laboratory Testing:  Recent Results (from the past 24 hour(s))   CBC with DIFF    Collection Time: 21  3:52 PM   Result Value Ref Range    WBC 6.8 3.5 - 11.3 k/uL    RBC 4.54 3.95 - 5.11 m/uL    Hemoglobin 13.5 11.9 - 15.1 g/dL    Hematocrit 41.1 36.3 - 47.1 %    MCV 90.5 82.6 - 102.9 fL    MCH 29.7 25.2 - 33.5 pg    MCHC 32.8 28.4 - 34.8 g/dL    RDW 12.6 11.8 - 14.4 %    Platelets 717 737 - 293 k/uL    MPV 9.9 8.1 - 13.5 fL    NRBC Automated 0.0 0.0 per 100 WBC    Differential Type NOT REPORTED     Seg Neutrophils 66 (H) 36 - 65 %    Lymphocytes 24 24 - 43 %    Monocytes 8 3 - 12 %    Eosinophils % 2 1 - 4 %    Basophils 0 0 - 2 %    Immature Granulocytes 0 0 %    Segs Absolute 4.50 1.50 - 8.10 k/uL    Absolute Lymph # 1.66 1.10 - 3.70 k/uL    Absolute Mono # 0.51 0.10 - 1.20 k/uL    Absolute Eos # 0.10 0.00 - 0.44 k/uL    Basophils Absolute 0.03 0.00 - 0.20 k/uL    Absolute Immature Granulocyte 0.02 0.00 - 0.30 k/uL    WBC Morphology NOT REPORTED     RBC Morphology NOT REPORTED     Platelet Estimate NOT REPORTED    Comprehensive Metabolic Panel w/ Reflex to MG    Collection Time: 07/16/21  3:52 PM   Result Value Ref Range    Glucose 99 70 - 99 mg/dL    BUN 13 6 - 20 mg/dL    CREATININE 0.81 0.50 - 0.90 mg/dL    Bun/Cre Ratio 16 9 - 20    Calcium 9.5 8.6 - 10.4 mg/dL    Sodium 134 (L) 135 - 144 mmol/L    Potassium 3.5 (L) 3.7 - 5.3 mmol/L    Chloride 101 98 - 107 mmol/L    CO2 19 (L) 20 - 31 mmol/L    Anion Gap 14 9 - 17 mmol/L    Alkaline Phosphatase 52 35 - 104 U/L    ALT 14 5 - 33 U/L    AST 16 <32 U/L    Total Bilirubin 0.36 0.3 - 1.2 mg/dL    Total Protein 7.2 6.4 - 8.3 g/dL    Albumin 4.3 3.5 - 5.2 g/dL    Albumin/Globulin Ratio NOT REPORTED 1.0 - 2.5    GFR Non-African American >60 >60 mL/min    GFR African American >60 >60 mL/min    GFR Comment          GFR Staging NOT REPORTED    HCG Qualitative, Serum    Collection Time: 07/16/21  3:52 PM   Result Value Ref Range    hCG Qual NEGATIVE NEGATIVE   TOX SCR, BLD, ED    Collection Time: 07/16/21  3:52 PM   Result Value Ref Range    Acetaminophen Level <10 (L) 10 - 30 ug/mL    Ethanol <10 <10 mg/dL    Ethanol percent <8.440 <1.187 %    Salicylate Lvl <1 (L) 3 - 10 mg/dL    Toxic Tricyclic Sc,Blood PENDING NEGATIVE   Magnesium    Collection Time: 07/16/21  3:52 PM   Result Value Ref Range    Magnesium 2.0 1.6 - 2.6 mg/dL   Urinalysis with Microscopic    Collection Time: 07/16/21  5:20 PM   Result Value Ref Range    Color, UA YELLOW YELLOW    Turbidity UA CLEAR CLEAR    Glucose, Ur NEGATIVE NEGATIVE    Bilirubin Urine NEGATIVE NEGATIVE    Ketones, Urine TRACE (A) NEGATIVE    Specific Gravity, UA 1.015 1.005 - 1.030    Urine Hgb NEGATIVE NEGATIVE    pH, UA 8.5 (H) 5.0 - 8.0    Protein, UA NEGATIVE NEGATIVE    Urobilinogen, Urine Normal Normal    Nitrite, Urine NEGATIVE NEGATIVE    Leukocyte Esterase, Urine NEGATIVE NEGATIVE    Urinalysis Comments NOT REPORTED     -          WBC, UA None 0 - 5 /HPF    RBC, UA None 0 - 2 /HPF    Casts UA NOT REPORTED /LPF    Crystals, UA NOT REPORTED None /HPF    Epithelial Cells UA 2 TO 5 0 - 5 /HPF    Renal Epithelial, UA NOT REPORTED 0 /HPF    Bacteria, UA NOT REPORTED None    Mucus, UA NOT REPORTED None    Trichomonas, UA NOT REPORTED None    Amorphous, UA NOT REPORTED None    Other Observations UA NOT REPORTED NOT REQ.     Yeast, UA NOT REPORTED None   Urine Drug Screen    Collection Time: 07/16/21  5:20 PM   Result Value Ref Range    Amphetamine Screen, Ur NEGATIVE NEGATIVE    Barbiturate Screen, Ur NEGATIVE NEGATIVE    Benzodiazepine Screen, Urine NEGATIVE NEGATIVE    Cocaine Metabolite, Urine NEGATIVE NEGATIVE    Methadone Screen, Urine NEGATIVE NEGATIVE    Opiates, Urine NEGATIVE NEGATIVE    Phencyclidine, Urine NEGATIVE NEGATIVE    Propoxyphene, Urine NOT REPORTED NEGATIVE    Cannabinoid Scrn, Ur POSITIVE (A) NEGATIVE    Oxycodone Screen, Ur NEGATIVE NEGATIVE    Methamphetamine, Urine NOT REPORTED NEGATIVE    Tricyclic Antidepressants, Urine NOT REPORTED NEGATIVE    MDMA, Urine NOT REPORTED NEGATIVE    Buprenorphine Urine NOT REPORTED NEGATIVE    Test Information       Assay provides medical screening only. The absence of expected drug(s) and/or metabolite(s) may indicate diluted or adulterated urine, limitations of testing or timing of collection. Imaging/Diagnostics:  CT HEAD WO CONTRAST    Result Date: 7/16/2021  No acute intracranial abnormality. Assessment :      Hospital Problems         Last Modified POA    * (Principal) Seizure-like activity ([facility] [address] 7/16/2021 Yes    Syncope 7/16/2021 Yes    Overview Signed 7/16/2021  6:21 PM by Charline Clay DO     has had involuntary movements with it         Anxiety 7/16/2021 Yes          Plan:     Patient status observation in the Med/Surge    1. Neuro eval, d/w Dr Adam Moseley  2. Mri brain  3. eeg  4. No driving for now  5. D/w boyfriend with patient consent    Consultations:   IP CONSULT TO NEUROLOGY  IP CONSULT TO HOSPITALIST  IP CONSULT TO Paz Clay DO  7/16/2021  6:23 PM    Copy sent to Dr. Tessa Person primary care provider on file.

## 2021-07-17 VITALS
HEART RATE: 74 BPM | HEIGHT: 64 IN | SYSTOLIC BLOOD PRESSURE: 116 MMHG | BODY MASS INDEX: 28.23 KG/M2 | RESPIRATION RATE: 16 BRPM | DIASTOLIC BLOOD PRESSURE: 79 MMHG | WEIGHT: 165.34 LBS | TEMPERATURE: 97.7 F | OXYGEN SATURATION: 98 %

## 2021-07-17 PROCEDURE — G0378 HOSPITAL OBSERVATION PER HR: HCPCS

## 2021-07-17 PROCEDURE — 99225 PR SBSQ OBSERVATION CARE/DAY 25 MINUTES: CPT | Performed by: PSYCHIATRY & NEUROLOGY

## 2021-07-17 PROCEDURE — 95816 EEG AWAKE AND DROWSY: CPT | Performed by: PSYCHIATRY & NEUROLOGY

## 2021-07-17 PROCEDURE — 99217 PR OBSERVATION CARE DISCHARGE MANAGEMENT: CPT | Performed by: INTERNAL MEDICINE

## 2021-07-17 PROCEDURE — 95819 EEG AWAKE AND ASLEEP: CPT

## 2021-07-17 ASSESSMENT — PAIN SCALES - GENERAL
PAINLEVEL_OUTOF10: 0

## 2021-07-17 NOTE — PROGRESS NOTES
[facility]  Office: [address], , Felix Cheli, DO, Naldo Mchugh, DO, Noa Clay, DO, Sima Toro MD, Kerri Early MD, Viki Seymour MD, Tayo Mariscal MD, Qasim Mcarthur MD, Gagandeep Fuentes MD, Gregorio Hardy MD, Genaro Chaudhari, DO, Vero Hubbard MD, Dewey Mcclellan, DO, Bre Bruce MD,  Ashely Deleon DO, Charisse Jordan MD, Charles Jaramillo MD, Radha Gonzales MD, Edmund Roque MD, Claribel Olivarez MD, Pedro Mccord MD, Johnnie Ewing, [facility], [facility], CNP, Radames Hernandez, CNP, Rufina Campos, CNS, June Gonzalez, CNP, Bryn Tamayo, CNP, Berto Bingham, CNP, Julisa Mohan, CNP, Dario Hart, CNP, Olinda Hansen PA-C, Stacey Nguyễn, [facility], Yohan Valverde, CNP, Rio Walter, CNP, Lamar Booth, CNP, Judy Gil, CNP, Ayad Thakkar, CNP, Kathryn Christianson CNP, Rosi Knox, [facility], Torrie Owens, [facility]    Progress Note    7/17/2021    8:33 AM    Name:   Iraida Santana  MRN:     [id number]     Acct:      [de-identified]   Room:   [address] [place] [address] Day:  0  Admit Date:  7/16/2021  3:28 PM    PCP:   No primary care provider on file. Code Status:  Full Code    Subjective:     C/C:   Chief Complaint   Patient presents with    Seizures     ater COVID shot     Interval History Status: improved. No further seizure like activity  About to have eeg    Just says she is sore all over    Brief History:     Iraida Santana is a 38 y.o. Non-/non  female who presents with Seizures (ater COVID shot)   and is admitted to the hospital for the management of Seizure-like activity ([facility] [address]    This 38 yof just had her 1st covid vaccine, watched her niece get it, then her daughter was about to get hers when she passed out and had tonic clonic activity, then was unarousable for 5-10 min per report. This occurred about 5 minutes after injection.   She has had several episodes of syncope in past, including getting data in the 24 hours ending 07/17/21 0833    Labs:  Hematology:  Recent Labs     07/16/21  1552   WBC 6.8   RBC 4.54   HGB 13.5   HCT 41.1   MCV 90.5   MCH 29.7   MCHC 32.8   RDW 12.6      MPV 9.9     Chemistry:  Recent Labs     07/16/21  1552   *   K 3.5*      CO2 19*   GLUCOSE 99   BUN 13   CREATININE 0.81   MG 2.0   ANIONGAP 14   LABGLOM >60   GFRAA >60   CALCIUM 9.5     Recent Labs     07/16/21  1552   PROT 7.2   LABALBU 4.3   AST 16   ALT 14   ALKPHOS 52   BILITOT 0.36     ABG:No results found for: POCPH, PHART, PH, POCPCO2, VBQ2KGB, PCO2, POCPO2, PO2ART, PO2, POCHCO3, WWI0ZJN, HCO3, NBEA, PBEA, BEART, BE, THGBART, THB, LTF3YFY, CGUC3MTI, R8IKQDJH, O2SAT, FIO2  Lab Results   Component Value Date/Time    SPECIAL NOT REPORTED 02/16/2015 03:15 PM     No results found for: CULTURE    Radiology:  CT HEAD WO CONTRAST    Result Date: 7/16/2021  No acute intracranial abnormality. Physical Examination:        General appearance:  alert, cooperative and no distress  Mental Status:  oriented to person, place and time and normal affect  Lungs:  clear to auscultation bilaterally, normal effort  Heart:  regular rate and rhythm, no murmur  Abdomen:  soft, nontender, nondistended, normal bowel sounds, no masses, hepatomegaly, splenomegaly  Extremities:  no edema, redness, tenderness in the calves  Skin:  no gross lesions, rashes, induration    Assessment:        Hospital Problems         Last Modified POA    * (Principal) Seizure-like activity ([facility] [address] 7/16/2021 Yes    Syncope 7/16/2021 Yes    Overview Signed 7/16/2021  6:21 PM by Edie Clay DO     has had involuntary movements with it         Anxiety 7/16/2021 Yes          Plan:        1.  Dc home later today after eeg and mri brain (if able to be completed-otherwise can be as outpatient)    Edie Clay DO  7/17/2021  8:33 AM

## 2021-07-17 NOTE — DISCHARGE SUMMARY
[facility]  Office: [address], DO, Iwona Garcia, DO, Jose Hill, DO, Candace Long Blood, DO, Jose Deng MD, Ted Darden MD, Chau Zhang MD, Candi Seo MD, Kris Valverde MD, Antoine Clarke MD, Sharon Iglesias MD, Marissa Howard, DO, Marlys Beyer MD, Betito Foley, DO, Becky Corbin MD,  Román Bergeron DO, Loreta Cordon MD, Kim Leach MD, Melvern Essex, MD, Matthew Pinzon MD, Supriya Talavrea MD, Yahir Chapman MD, Luz Marina Dunn, [facility], [facility], CNP, Jenny Hughes, CNP, Mello Spaulding, CNS, Domingo Harris, CNP, Anabelle Horowitz, CNP, Warren Delatorre, CNP, Stewart Cannon, CNP, Katharina Forbes, CNP, Kandy Rossi PA-C, Milad Lara, [facility], Juan José Copeland, CNP, Leanna Pascual, CNP, Ramiro Joseph, CNP, Lola Hong, CNP, Jaylin Benites, CNP, Rosalia Christiansen, CNP, Janis Meadows, [facility], Vicki Barrientos, [facility]    Discharge Summary     Patient ID: Rosalie Joseph  :  1985   MRN: [id number]     ACCOUNT:  [de-identified]   Patient's PCP: No primary care provider on file. Admit Date: 2021   Discharge Date: 2021     Length of Stay: 1  Code Status:  Full Code  Admitting Physician: Edie Clay DO  Discharge Physician: Edie Clay DO     Active Discharge Diagnoses:     Hospital Problem Lists:  Principal Problem:    Seizure-like activity [facility])  Active Problems:    Syncope    Anxiety  Resolved Problems:    * No resolved hospital problems. *      Admission Condition:  fair     Discharged Condition: stable    Hospital Stay:     Hospital Course:  Rosalie Joseph is a 38 y.o. female who was admitted for the management of  Seizure-like activity [facility]) , presented to ER with Seizures (ater COVID shot)    Admitted after having seizure like activity following her 1st covid vaccine. Has had prior anxiety induced syncopal events, including abnormal movements with them at times.     Evaluated by neuro and had eeg done, which was normal.  Mri brain ordered but mri unavailable on weekend so she will have this as an outpatient. Had been loaded with keppra in Er but neuro says no further anti-epileptics at this time      Significant therapeutic interventions: see above    Significant Diagnostic Studies:   Labs / Micro:  CBC:   Lab Results   Component Value Date    WBC 6.8 07/16/2021    RBC 4.54 07/16/2021    HGB 13.5 07/16/2021    HCT 41.1 07/16/2021    MCV 90.5 07/16/2021    MCH 29.7 07/16/2021    MCHC 32.8 07/16/2021    RDW 12.6 07/16/2021     07/16/2021     CMP:    Lab Results   Component Value Date    GLUCOSE 99 07/16/2021     07/16/2021    K 3.5 07/16/2021     07/16/2021    CO2 19 07/16/2021    BUN 13 07/16/2021    CREATININE 0.81 07/16/2021    ANIONGAP 14 07/16/2021    ALKPHOS 52 07/16/2021    ALT 14 07/16/2021    AST 16 07/16/2021    BILITOT 0.36 07/16/2021    LABALBU 4.3 07/16/2021    ALBUMIN NOT REPORTED 07/16/2021    LABGLOM >60 07/16/2021    GFRAA >60 07/16/2021    GFR      07/16/2021    GFR NOT REPORTED 07/16/2021    PROT 7.2 07/16/2021    CALCIUM 9.5 07/16/2021        Radiology:  CT HEAD WO CONTRAST    Result Date: 7/16/2021  No acute intracranial abnormality. Consultations:    Consults:     Final Specialist Recommendations/Findings:   IP CONSULT TO NEUROLOGY  IP CONSULT TO HOSPITALIST  IP CONSULT TO NEUROLOGY      The patient was seen and examined on day of discharge and this discharge summary is in conjunction with any daily progress note from day of discharge.     Discharge plan:     Disposition: Home    Physician Follow Up:   pcp       Requiring Further Evaluation/Follow Up POST HOSPITALIZATION/Incidental Findings: needs mri brain seizure protocol    Diet: regular diet    Activity: As tolerated    Instructions to Patient: take medications as prescribed  Allowed to drive    Discharge Medications:      Medication List      CONTINUE taking these medications    citalopram 40 MG tablet  Commonly known as: CELEXA     hydrOXYzine 25 MG capsule  Commonly known as: VISTARIL     ibuprofen 800 MG tablet  Commonly known as: ADVIL;MOTRIN  Take 1 tablet by mouth every 6 hours as needed for Pain. No discharge procedures on file. Time Spent on discharge is  20 mins in patient examination, evaluation, counseling as well as medication reconciliation, prescriptions for required medications, discharge plan and follow up. Electronically signed by   Kassi Clay DO  7/17/2021  11:08 AM      Thank you Dr. Ochoa primary care provider on file. for the opportunity to be involved in this patient's care.

## 2021-07-17 NOTE — PROGRESS NOTES
Neurology was asked about driving restrictions and informed that MRI is not in until Monday, and patient is asking about having it done outpatient. Neuro will be down to speak with the patient.

## 2021-07-17 NOTE — PROGRESS NOTES
Patient education complete and questions answered. All paperwork was signed and went over with patient. Transported via w/c to private car to be transported home.

## 2021-07-17 NOTE — PROGRESS NOTES
[facility] Neurology   IN-PATIENT SERVICE      NEUROLOGY PROGRESS  NOTE            Date:   2021  Patient name:  Farzaneh Osorio  Date of admission:  2021  YOB: 1985      Interval History:     No current issues. No further seizure-like activity. EEG is normal.  Apparently MRI is not available on . History of Present Illness: The patient is a 38 y.o. female who presents with Seizures (ater COVID shot)  . The patient was seen and examined and the chart was reviewed. Received covid vaccine earlier today, then few mins after started to feel anxious, eyes rolled back, started to have convulsions and lost consciousness. No described loss of bowel/bladder control. No tongue biting, or foaming at mouth. Brought to ED for further work up. Prolactin level 91. (+) cannabinoids. CT head negative. Currently at baseline, awake, oriented exam non focal. States similar event in the past when receiving nose piercing in which she does recall feeling anxious prior to. She has hx of anxiety in which she takes Celexa and Vistaril for. She will be admitted for further seizure work up. Past Medical History:     Past Medical History:   Diagnosis Date    Anxiety     Depression     Syncope     has had involuntary movements with it        Past Surgical History:     History reviewed. No pertinent surgical history. Medications during admission:      citalopram  40 mg Oral Daily    sodium chloride flush  5-40 mL Intravenous 2 times per day    enoxaparin  40 mg Subcutaneous Daily         Physical Exam:   BP (!) 94/57   Pulse 84   Temp 97.9 °F (36.6 °C) (Axillary)   Resp 16   Ht 5' 4\" (1.626 m)   Wt 165 lb 5.5 oz (75 kg)   SpO2 97%   BMI 28.38 kg/m²   Temp (24hrs), Av.2 °F (36.8 °C), Min:97.9 °F (36.6 °C), Max:98.6 °F (37 °C)        Neurological examination:    Mental status   Alert and oriented x 3; following all commands; speech is fluent, no dysarthria, aphasia.       Cranial nerves II - visual fields intact to confrontation; pupils reactive  III, IV, VI - extraocular muscles intact; no WALLY; no nystagmus; no ptosis   V - normal facial sensation                                                               VII - normal facial symmetry                                                             VIII - intact hearing                                                                             IX, X - symmetrical palate elevation                                               XI - symmetrical shoulder shrug                                                       XII - midline tongue without atrophy or fasciculation     Motor function  Strength: 5/5 RUE, 5/5 RLE, 5/5 LUE, 5/5  LLE  Normal bulk and tone. No tremors                      Sensory function Intact to touch, pin, vibration, proprioception throughout     Cerebellar Intact finger-nose-finger testing. Intact heel-shin testing. No dysdiadochokinesia present. Reflex function 2/4 symmetric throughout . Downgoing plantar response bilaterally. (-)Ruby's sign bilaterally    Gait                  Normal station and gait             Diagnostics:      Laboratory Testing:  CBC:   Recent Labs     07/16/21  1552   WBC 6.8   HGB 13.5        BMP:    Recent Labs     07/16/21  1552   *   K 3.5*      CO2 19*   BUN 13   CREATININE 0.81   GLUCOSE 99         Lab Results   Component Value Date    ALT 14 07/16/2021    AST 16 07/16/2021         Imaging/Diagnostics:      EEG: Normal.  No epileptiform activity. CT head: No acute intracranial abnormalities. I personally reviewed all of the above medications, clinical laboratory, imaging and other diagnostic tests. Impression:      1.  Seizure like activity with feeling of anxiety preceding event; suspect convulsive syncope, doubt epileptic seizure    Plan:      MRI brain pending; will have to be done as outpatient as they are not here on the weekend   No indication for antiepileptic at this time   Patient is okay to drive, I believe this is a convulsive syncope event and not a true seizure   Recommend following up with her PCP for possible adjustment of her anxiety medications   She is to follow-up with us in the office in 3 weeks   Stable neurologically for discharge at this time        Electronically signed by Brook Crowley DO on 7/17/2021 at 9:51 AM      Brook Crowley, [address] [place] [address]  Neurology

## 2021-07-17 NOTE — CARE COORDINATION
Case Management Initial Discharge Plan  Coni Kim,         Readmission Risk              Risk of Unplanned Readmission:  0             Met with:patient to discuss discharge plans. Information verified: address, contacts, phone number, , insurance Yes  PCP: No primary care provider on file. Date of last visit: na     Insurance Provider: none      Discharge Planning  Current Residence:  Private home   Living Arrangements:  Spouse/Significant Other, Children       Home has 2 stories/3 stairs to climb to enter. Bed and bath are upstairs. Support Systems:  Family Members       Current Services PTA:  None  Agency: none      atient able to perform ADL's:Independent  DME in home:  None   DME used to aid ambulation prior to admission:   None   DME used during admission:  None     Potential Assistance Needed:  N/A    [facility]    Potential Assistance Purchasing Medications:  No  Does patient want to participate in local refill/ meds to beds program?  Not Assessed    Patient agreeable to home care: No  [place] of choice provided:  n/a      Type of Home Care Services:  None  Patient expects to be discharged to:   home    Prior SNF/Rehab Placement and Facility: none  Agreeable to SNF/Rehab: No  [place] of choice provided: n/a   Evaluation: n/a    Expected Discharge date:  21  Follow Up Appointment: Best Day/ Time: Monday AM    Transportation provider: per family  Transportation arrangements needed for discharge: No    Discharge Plan:   Met with patient to follow upon discharge plan of care. Patient lives at home with her 13 year daughter and niece. She has good support with her mother and significant other Shannon Gabriel. Patient has no insurance nor pcp. Clinic lists given. She has no anticipated needs. Did discuss good rx and she is aware of the program and uses [facility] as well. Patient admitted with seizures.  Will follow       Electronically signed by Candy Meade Radhika Dies on 7/17/21 at 1:11 PM EDT

## 2021-07-17 NOTE — ACP (ADVANCE CARE PLANNING)
Advance Care Planning     Advance Care Planning Activator (Inpatient)  Conversation Note      Date of ACP Conversation: 7/17/2021     Conversation Conducted with: Patient with Decision Making Capacity    ACP Activator: Mary Bear RN        Health Care Decision Maker:     Current Designated Health Care Decision Maker:     Click here to complete Healthcare Decision Makers including section of the Healthcare Decision Maker Relationship (ie \"Primary\")  Today we documented Decision Maker(s) consistent with Legal Next of Kin hierarchy. Care Preferences    Ventilation: \"If you were in your present state of health and suddenly became very ill and were unable to breathe on your own, what would your preference be about the use of a ventilator (breathing machine) if it were available to you? \"      Would the patient desire the use of ventilator (breathing machine)?: yes    \"If your health worsens and it becomes clear that your chance of recovery is unlikely, what would your preference be about the use of a ventilator (breathing machine) if it were available to you? \"     Would the patient desire the use of ventilator (breathing machine)?: Yes      Resuscitation  \"CPR works best to restart the heart when there is a sudden event, like a heart attack, in someone who is otherwise healthy. Unfortunately, CPR does not typically restart the heart for people who have serious health conditions or who are very sick. \"    \"In the event your heart stopped as a result of an underlying serious health condition, would you want attempts to be made to restart your heart (answer \"yes\" for attempt to resuscitate) or would you prefer a natural death (answer \"no\" for do not attempt to resuscitate)? \" yes       [x] Yes   [] No   Educated Patient / [place] regarding differences between Advance Directives and portable DNR orders.     Length of ACP Conversation in minutes:      Conversation Outcomes:  [x] ACP discussion completed  [] Existing advance directive reviewed with patient; no changes to patient's previously recorded wishes  [] New Advance Directive completed  [] Portable Do Not Rescitate prepared for Provider review and signature  [] POLST/POST/MOLST/MOST prepared for Provider review and signature      Follow-up plan:    [] Schedule follow-up conversation to continue planning  [] Referred individual to Provider for additional questions/concerns   [] Advised patient/agent/surrogate to review completed ACP document and update if needed with changes in condition, patient preferences or care setting    [x] This note routed to one or more involved healthcare providers        Mother Kristelsantino Murryo is her medical POA

## 2021-07-17 NOTE — FLOWSHEET NOTE
07/17/21 1048   Provider Notification   Reason for Communication Evaluate   Provider Name Dr. Clay   Provider Notification Physician   Method of Communication Secure Message   Notification Time [phone]

## 2021-07-17 NOTE — PROGRESS NOTES
Dr. Clay updated on patient's EEG results and informed him MRI will not be back in until Monday and patient inquired on having it done outpatient.

## 2021-07-17 NOTE — PROGRESS NOTES
Patient admitted to room 1009  VS taken, telemetry placed and call light given/within reach. Patient A&O and given room orientation. Orders released/reviewed, initial assessment completed and navigator started. Seizure precautions in place, telesitter activated. See chart for more detail.

## 2021-07-17 NOTE — PROCEDURES
EEG REPORT    Patient Name: Ginna Renee  Patient MRN: [id number]    Referring Physician: Daily Delgado DO  Dictating Physician: Mookie Decker DO  Date: 7/17/2021      CLINICAL HISTORY: This EEG was performed on a 38 y.o. female with The encounter diagnosis was Seizure-like activity ([facility] [address] . It is being performed to evaluate for seizure activity. CURRENT ANTI-EPILEPTIC MEDICATIONS: None    DESCRIPTION: Wakefulness and drowsiness were obtained. During wakefulness there was a posterior background of regular, reactive, symmetrical, moderate voltage 10-11 Hz activity with an anterior background of low voltage mixed frequencies. During light drowsiness there was symmetrical slowing of the waking background. Photic stimulation evoked no significant posterior driving response. Hyperventilation was performed and elicited no abnormalities. EEG DIAGNOSIS: Normal    CLINICAL INTERPRETATION: No epileptiform activity or evidence of focal cerebral dysfunction was present. No electrographic seizures were recorded.      Mookie Decker, [address] [place] [address]  Neurology

## 2021-07-19 LAB
EKG ATRIAL RATE: 89 BPM
EKG P AXIS: 64 DEGREES
EKG P-R INTERVAL: 180 MS
EKG Q-T INTERVAL: 372 MS
EKG QRS DURATION: 78 MS
EKG QTC CALCULATION (BAZETT): 452 MS
EKG R AXIS: 65 DEGREES
EKG T AXIS: 31 DEGREES
EKG VENTRICULAR RATE: 89 BPM
HCG, PREGNANCY URINE (POC): NEGATIVE

## 2024-08-13 ENCOUNTER — HOSPITAL ENCOUNTER (OUTPATIENT)
Age: 39
Discharge: HOME | End: 2024-08-13
Payer: COMMERCIAL

## 2024-08-13 PROCEDURE — 93005 ELECTROCARDIOGRAM TRACING: CPT | Performed by: NURSE PRACTITIONER

## 2024-08-14 LAB
EKG ATRIAL RATE: 66 BPM
EKG P AXIS: 63 DEGREES
EKG P-R INTERVAL: 156 MS
EKG Q-T INTERVAL: 398 MS
EKG QRS DURATION: 82 MS
EKG QTC CALCULATION (BAZETT): 417 MS
EKG R AXIS: 94 DEGREES
EKG T AXIS: 34 DEGREES
EKG VENTRICULAR RATE: 66 BPM